# Patient Record
Sex: MALE | Race: WHITE | NOT HISPANIC OR LATINO | Employment: OTHER | URBAN - METROPOLITAN AREA
[De-identification: names, ages, dates, MRNs, and addresses within clinical notes are randomized per-mention and may not be internally consistent; named-entity substitution may affect disease eponyms.]

---

## 2017-06-04 ENCOUNTER — APPOINTMENT (OUTPATIENT)
Dept: CT IMAGING | Facility: HOSPITAL | Age: 82
End: 2017-06-04

## 2017-06-04 ENCOUNTER — HOSPITAL ENCOUNTER (EMERGENCY)
Facility: HOSPITAL | Age: 82
Discharge: HOME | End: 2017-06-04
Attending: EMERGENCY MEDICINE | Admitting: EMERGENCY MEDICINE

## 2017-06-04 ENCOUNTER — APPOINTMENT (OUTPATIENT)
Dept: GENERAL RADIOLOGY | Facility: HOSPITAL | Age: 82
End: 2017-06-04

## 2017-06-04 VITALS
HEART RATE: 101 BPM | DIASTOLIC BLOOD PRESSURE: 77 MMHG | HEIGHT: 70 IN | OXYGEN SATURATION: 95 % | RESPIRATION RATE: 18 BRPM | WEIGHT: 154 LBS | BODY MASS INDEX: 22.05 KG/M2 | TEMPERATURE: 97.9 F | SYSTOLIC BLOOD PRESSURE: 141 MMHG

## 2017-06-04 DIAGNOSIS — S52.001A FRACTURE, RADIUS AND ULNA, PROXIMAL, RIGHT, CLOSED, INITIAL ENCOUNTER: Primary | ICD-10-CM

## 2017-06-04 DIAGNOSIS — S52.101A FRACTURE, RADIUS AND ULNA, PROXIMAL, RIGHT, CLOSED, INITIAL ENCOUNTER: Primary | ICD-10-CM

## 2017-06-04 PROCEDURE — 73200 CT UPPER EXTREMITY W/O DYE: CPT

## 2017-06-04 PROCEDURE — 99283 EMERGENCY DEPT VISIT LOW MDM: CPT

## 2017-06-04 PROCEDURE — 73070 X-RAY EXAM OF ELBOW: CPT

## 2017-06-04 RX ORDER — LISINOPRIL 2.5 MG/1
2.5 TABLET ORAL DAILY
COMMUNITY

## 2017-06-04 RX ORDER — HYDROCODONE BITARTRATE AND ACETAMINOPHEN 7.5; 325 MG/1; MG/1
1 TABLET ORAL ONCE
Status: COMPLETED | OUTPATIENT
Start: 2017-06-04 | End: 2017-06-04

## 2017-06-04 RX ORDER — HYDROCODONE BITARTRATE AND ACETAMINOPHEN 5; 325 MG/1; MG/1
1 TABLET ORAL EVERY 6 HOURS PRN
Qty: 20 TABLET | Refills: 0 | Status: SHIPPED | OUTPATIENT
Start: 2017-06-04

## 2017-06-04 RX ORDER — PREDNISONE 10 MG/1
15 TABLET ORAL DAILY
COMMUNITY

## 2017-06-04 RX ORDER — CARVEDILOL 3.12 MG/1
3.12 TABLET ORAL 2 TIMES DAILY WITH MEALS
COMMUNITY

## 2017-06-04 RX ADMIN — HYDROCODONE BITARTRATE AND ACETAMINOPHEN 1 TABLET: 7.5; 325 TABLET ORAL at 08:38

## 2017-06-04 NOTE — ED PROVIDER NOTES
Subjective   HPI Comments: 89-year-old white male complaining of right elbow pain.  Patient states that yesterday morning he got up out of bed tripped and fell onto his right elbow.  Since that time he has had pain that has worsened in the right elbow.  He denies head injury, back pain, hip pain, or any other extremity complaints.  He emphatically states that his right elbow is the only area of concern.  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  He denies numbness, or weakness to this extremity.    Patient is a 89 y.o. male presenting with upper extremity pain.   History provided by:  Patient  Upper Extremity Issue   Location:  Elbow  Elbow location:  R elbow  Injury: yes    Time since incident:  2 days  Mechanism of injury: fall    Fall:     Fall occurred:  From a bed    Impact surface:  Carpet    Entrapped after fall: no    Pain details:     Quality:  Dull    Severity:  Moderate    Onset quality:  Sudden    Timing:  Constant  Handedness:  Right-handed  Dislocation: no    Prior injury to area:  No  Relieved by:  Immobilization  Worsened by:  Movement  Ineffective treatments:  None tried  Associated symptoms: swelling    Associated symptoms: no back pain, no fever, no muscle weakness, no numbness and no tingling    Risk factors: no concern for non-accidental trauma        Review of Systems   Constitutional: Negative for fever.   Musculoskeletal: Negative for back pain.   All other systems reviewed and are negative.      Past Medical History:   Diagnosis Date   • Atrial fibrillation    • Hypertension        Allergies   Allergen Reactions   • Levaquin [Levofloxacin] Other (See Comments)     BLOOD CLOT IN LEGS       Past Surgical History:   Procedure Laterality Date   • APPENDECTOMY     • CARDIOVERSION     • INGUINAL HERNIA REPAIR     • TONSILLECTOMY     • TOTAL SHOULDER REPLACEMENT         History reviewed. No pertinent family  history.    Social History     Social History   • Marital status:      Spouse name: N/A   • Number of children: N/A   • Years of education: N/A     Social History Main Topics   • Smoking status: Former Smoker     Years: 40.00   • Smokeless tobacco: None   • Alcohol use Yes      Comment: 1 GLASS WINE DAILY    • Drug use: No   • Sexual activity: Not Asked     Other Topics Concern   • None     Social History Narrative   • None           Objective   Physical Exam   Constitutional: He appears well-developed and well-nourished.   HENT:   Head: Normocephalic and atraumatic.   Eyes: Conjunctivae are normal.   Musculoskeletal: He exhibits edema.   Right elbow reveals swelling with tenderness globally.  There is no redness or warmth to this joint.  Radial, ulnar, median, sensory and motor nerve intact.  He has no other tenderness or decrease in range of motion to this extremity.  The right elbow is quite painful on mild flexion or extension.  Supination and abduction was not attempted.   Neurological: He is alert.   Psychiatric: He has a normal mood and affect. His behavior is normal. Judgment and thought content normal.   Nursing note and vitals reviewed.      Procedures         ED Course  ED Course   Comment By Time   I spoke with Dr. Ulloa.  He recommended posterior splint and sling.  Patient lives in [facility] and he states that he will follow-up with his orthopedist when he gets there. DANA Terrazas 06/04 1030                  MDM    Final diagnoses:   Fracture, radius and ulna, proximal, right, closed, initial encounter            DANA Terrazas  06/04/17 1034

## 2017-06-04 NOTE — DISCHARGE INSTRUCTIONS
Follow-up with your orthopedist when you get back to your home in [place].  Keep splint and sling and use until that time with the exception of the sling.  The sling is to not be used at night while sleeping and needs to be taken out at least 3 times a day with the shoulder being exercised for at least 10 minutes 3-5 times a day.  Information regarding Risks and Benefits When using Opioids and Other Controlled Substances to include Storage and Disposal of Medications    When considering the use of opioids and other controlled substances for the control of pain, anxiety, or for other medical purposes, you need to know of not only the benefits of these drugs but also of potential risks in using these drugs. These drugs, as well as more drugs, have beneficial uses; which is why their use is being considered in your   care, but they have risks involved in their use, too.    Opioids:  Opioids such as hydrocodone, oxycodone, hydromorphone, and codeine are pain relieving drugs, some more potent than others. They are most useful for moderate to more severe painful conditions. Risks include sedation, loss of coordination, decreased concentration, and decreased breathing with possibility of loss of consciousness or even death, especially if used in doses higher than prescribed. Improper usage can lead to addiction, tolerance, or overdose. In addition, many of these drugs are combined with acetaminophen (Tylenol) which can damage or destroy our liver when used excessively.  Alternatives to opioids are useful for mild to moderate pain and include ibuprofen (Motrin), naproxen (Aleve), aspirin, and acetaminophen (Tylenol). As with other drugs, these medications should be used according to directions on the label or from your doctor, as overuse can cause harm.    Benzodiazepines:  This group of drugs include: alprazolam (Xanax), diazepam (Valium), lorazepam (Ativan), and clonazepam (Klonopin). These drugs are used to  control anxiety symptoms including anxiety and panic attacks. Risks using these drugs include: sedation, loss of coordination, decreased ability to concentrate, effects on memory, and decreased breathing with possibility of loss of consciousness or even death. Improper and prolonged usage can lead to addiction. An alternative without addiction potential is hydroxyzine (Vistrail).    Other Controlled Substance:  This group includes Soma, Tramadol, stimulant drugs such as Ritalin, and others. Stimulant drugs are not medications that are prescribed by ER doctors. Soma and Tramadol have sedative and addictive affects similar to opioids with the same dangers mentioned with them.    Overdose:  If you or someone else are concerned that overdose has occurred, call 911 for transportation to the nearest hospital.    Storage and Disposal:  All medications need to be kept out of the reach of children or adults who cannot manage their own medicines. In addition, controlled substances can be targeted by criminals so extra precautions need to be taken to keep them in a safe, secure place. Any unused medications should be disposed of by flushing them down the toilet in the home setting or contact your local pharmacy.